# Patient Record
Sex: FEMALE | Race: WHITE | Employment: STUDENT | URBAN - METROPOLITAN AREA
[De-identification: names, ages, dates, MRNs, and addresses within clinical notes are randomized per-mention and may not be internally consistent; named-entity substitution may affect disease eponyms.]

---

## 2024-07-01 ENCOUNTER — APPOINTMENT (OUTPATIENT)
Dept: PRIMARY CARE | Facility: CLINIC | Age: 24
End: 2024-07-01
Payer: COMMERCIAL

## 2024-07-15 ENCOUNTER — LAB (OUTPATIENT)
Dept: LAB | Facility: LAB | Age: 24
End: 2024-07-15
Payer: COMMERCIAL

## 2024-07-15 ENCOUNTER — APPOINTMENT (OUTPATIENT)
Dept: PRIMARY CARE | Facility: CLINIC | Age: 24
End: 2024-07-15
Payer: COMMERCIAL

## 2024-07-15 VITALS
TEMPERATURE: 97.4 F | HEIGHT: 65 IN | OXYGEN SATURATION: 98 % | WEIGHT: 211.2 LBS | HEART RATE: 85 BPM | SYSTOLIC BLOOD PRESSURE: 110 MMHG | DIASTOLIC BLOOD PRESSURE: 64 MMHG | BODY MASS INDEX: 35.19 KG/M2

## 2024-07-15 DIAGNOSIS — Z00.00 ROUTINE GENERAL MEDICAL EXAMINATION AT A HEALTH CARE FACILITY: Primary | ICD-10-CM

## 2024-07-15 DIAGNOSIS — Z23 NEED FOR VACCINATION: ICD-10-CM

## 2024-07-15 DIAGNOSIS — Z13.220 SCREENING CHOLESTEROL LEVEL: ICD-10-CM

## 2024-07-15 DIAGNOSIS — Z01.84 IMMUNITY STATUS TESTING: ICD-10-CM

## 2024-07-15 LAB
CHOLEST SERPL-MCNC: 160 MG/DL (ref 0–199)
CHOLESTEROL/HDL RATIO: 3.5
HDLC SERPL-MCNC: 45.5 MG/DL
LDLC SERPL CALC-MCNC: 91 MG/DL
NON HDL CHOLESTEROL: 115 MG/DL (ref 0–149)
TRIGL SERPL-MCNC: 116 MG/DL (ref 0–149)
VLDL: 23 MG/DL (ref 0–40)

## 2024-07-15 PROCEDURE — 36415 COLL VENOUS BLD VENIPUNCTURE: CPT

## 2024-07-15 PROCEDURE — 86706 HEP B SURFACE ANTIBODY: CPT

## 2024-07-15 PROCEDURE — 80061 LIPID PANEL: CPT

## 2024-07-15 PROCEDURE — 99395 PREV VISIT EST AGE 18-39: CPT | Performed by: FAMILY MEDICINE

## 2024-07-15 PROCEDURE — 90471 IMMUNIZATION ADMIN: CPT | Performed by: FAMILY MEDICINE

## 2024-07-15 PROCEDURE — 90715 TDAP VACCINE 7 YRS/> IM: CPT | Performed by: FAMILY MEDICINE

## 2024-07-15 PROCEDURE — 86481 TB AG RESPONSE T-CELL SUSP: CPT

## 2024-07-15 PROCEDURE — 1036F TOBACCO NON-USER: CPT | Performed by: FAMILY MEDICINE

## 2024-07-15 ASSESSMENT — PATIENT HEALTH QUESTIONNAIRE - PHQ9
1. LITTLE INTEREST OR PLEASURE IN DOING THINGS: NOT AT ALL
SUM OF ALL RESPONSES TO PHQ9 QUESTIONS 1 AND 2: 0
2. FEELING DOWN, DEPRESSED OR HOPELESS: NOT AT ALL

## 2024-07-15 ASSESSMENT — ENCOUNTER SYMPTOMS
OCCASIONAL FEELINGS OF UNSTEADINESS: 0
DEPRESSION: 0

## 2024-07-15 NOTE — PROGRESS NOTES
"Subjective   Patient ID: Desi Alexander is a 24 y.o. female who presents for new paitent and Annual Exam (See health form).    Currently working on weight loss   Has lost 30lbs since November   Is dieting and exercising regularly     Has never had pap before   Currently on period   She plans to schedule thsi when she arrives in [place] for school.       Objective   /64 (BP Location: Left arm, Patient Position: Sitting, BP Cuff Size: Adult)   Pulse 85   Temp 36.3 °C (97.4 °F) (Temporal)   Ht 1.655 m (5' 5.16\")   Wt 95.8 kg (211 lb 3.2 oz)   SpO2 98%   BMI 34.98 kg/m²     Physical Exam  Constitutional:       General: She is not in acute distress.     Appearance: Normal appearance. She is not ill-appearing, toxic-appearing or diaphoretic.   HENT:      Head: Normocephalic and atraumatic.   Eyes:      Extraocular Movements: Extraocular movements intact.      Pupils: Pupils are equal, round, and reactive to light.   Cardiovascular:      Rate and Rhythm: Normal rate and regular rhythm.      Pulses: Normal pulses.      Heart sounds: Normal heart sounds.   Pulmonary:      Effort: Pulmonary effort is normal.      Breath sounds: Normal breath sounds.   Neurological:      Mental Status: She is alert.         Assessment/Plan   Diagnoses and all orders for this visit:  Routine general medical examination at a health care facility  Immunity status testing  -     Hepatitis B surface antibody; Future  -     T-Spot TB; Future  Need for vaccination  -     Tdap vaccine, age 7 years and older  (BOOSTRIX)  Screening cholesterol level  -     Lipid panel; Future    Filled out paperwork for medical school.   Doing well; will get screening labs as ordered for attendance in medical school.       Sarah Gomez, DO     I spent a total of 19 minutes on the date of the service which included preparing to see the patient, face-to-face patient care, completing clinical documentation, performing a medically appropriate examination, " counseling and educating the patient/family/caregiver and ordering medications, tests, or procedures.

## 2024-07-16 LAB — HBV SURFACE AB SER-ACNC: 43.7 MIU/ML

## 2024-07-17 LAB
NIL(NEG) CONTROL SPOT COUNT: NORMAL
PANEL A SPOT COUNT: 0
PANEL B SPOT COUNT: 0
POS CONTROL SPOT COUNT: NORMAL
T-SPOT. TB INTERPRETATION: NEGATIVE